# Patient Record
Sex: FEMALE
[De-identification: names, ages, dates, MRNs, and addresses within clinical notes are randomized per-mention and may not be internally consistent; named-entity substitution may affect disease eponyms.]

---

## 2021-07-12 ENCOUNTER — NURSE TRIAGE (OUTPATIENT)
Dept: OTHER | Facility: CLINIC | Age: 76
End: 2021-07-12

## 2021-07-12 NOTE — TELEPHONE ENCOUNTER
period? \"      N/A    Protocols used: NEUROLOGIC DEFICIT-ADULT-AH    Brief description of triage: see above. Triage indicates for patient to call 911 or taken to the ER now. Care advice provided, patient verbalizes understanding; denies any other questions or concerns; instructed to call back for any new or worsening symptoms. This triage is a result of a call to [facility] Nurse. Please do not respond to the triage nurse through this encounter. Any subsequent communication should be directly with the patient.